# Patient Record
Sex: FEMALE | Race: BLACK OR AFRICAN AMERICAN | Employment: UNEMPLOYED | URBAN - METROPOLITAN AREA
[De-identification: names, ages, dates, MRNs, and addresses within clinical notes are randomized per-mention and may not be internally consistent; named-entity substitution may affect disease eponyms.]

---
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
## 2021-06-15 ENCOUNTER — OFFICE VISIT (OUTPATIENT)
Dept: OBGYN | Age: 21
End: 2021-06-15
Payer: COMMERCIAL

## 2021-06-15 ENCOUNTER — HOSPITAL ENCOUNTER (OUTPATIENT)
Age: 21
Setting detail: SPECIMEN
Discharge: HOME | End: 2021-06-15
Payer: COMMERCIAL

## 2021-06-15 VITALS
HEART RATE: 85 BPM | SYSTOLIC BLOOD PRESSURE: 124 MMHG | HEIGHT: 64 IN | DIASTOLIC BLOOD PRESSURE: 76 MMHG | WEIGHT: 155.8 LBS | BODY MASS INDEX: 26.6 KG/M2

## 2021-06-15 DIAGNOSIS — Z01.419 WELL WOMAN EXAM WITH ROUTINE GYNECOLOGICAL EXAM: Primary | ICD-10-CM

## 2021-06-15 DIAGNOSIS — Z01.419 WELL WOMAN EXAM WITH ROUTINE GYNECOLOGICAL EXAM: ICD-10-CM

## 2021-06-15 LAB
DIRECT EXAM: NORMAL
Lab: NORMAL
SPECIMEN DESCRIPTION: NORMAL

## 2021-06-15 PROCEDURE — 99211 OFF/OP EST MAY X REQ PHY/QHP: CPT | Performed by: STUDENT IN AN ORGANIZED HEALTH CARE EDUCATION/TRAINING PROGRAM

## 2021-06-15 PROCEDURE — 99385 PREV VISIT NEW AGE 18-39: CPT | Performed by: STUDENT IN AN ORGANIZED HEALTH CARE EDUCATION/TRAINING PROGRAM

## 2021-06-15 NOTE — PROGRESS NOTES
[facility] OB/GYN Annual Visit    Oliver Valladares  6/15/2021                       Primary Care Physician: Vita Pierre MD    CC:   Chief Complaint   Patient presents with    Established New Doctor         HPI: Oliver Valladares is a 24 y.o. female G0    The patient was seen and examined. She is here to establish care. She denies any complaints. Her Patient's last menstrual period was 06/08/2021 (approximate). . She denies irregular/heavy bleeding and dysmenorrhea. Her periods are regular and last 3-6 days. She describes them as heavy. She uses 3-4 pads per day and denies s/s of anemia. Her bowel habits are regular. She denies any bloating. She denies dysuria. She denies urinary leaking. She denies vaginal discharge. She is not sexually active and is not desiring pregnancy.      Depression Screen: Symptoms of decreased mood absent  Symptoms of anhedonia absent  **If either question is answered in a  positive fashion then complete the PHQ9 Scoring Evaluation and make the appropriate referral**    REVIEW OF SYSTEMS:   An 11 point review of systems was completed    Constitutional: negative fever, negative chills  HEENT: negative visual disturbances, negative headaches  Respiratory: negative dyspnea, negative cough  Cardiovascular: negative chest pain,  negative palpitations  Gastrointestinal: negative abdominal pain, negative RUQ pain, negative N/V, negative diarrhea, negative constipation  Genitourinary: negative dysuria, negative vaginal discharge  Dermatological: negative rash, negative wounds  Hematologic: negative bleeding/clotting disorder  Immunologic: negative recent illness, negative recent sick contact, negative allergic reactions  Lymphatic: negative lymph nodes  Musculoskeletal: negative back pain, negative myalgias, negative arthralgias  Neurological:  negative dizziness, negative weakness  Behavior/Psych: negative depression, negative anxiety ________________________________________________________________________    GYNECOLOGICAL HISTORY:  Age of Menarche: 16  Age of Menopause: N/A      Sexually Active: not sexually active  STD History: no past history    Pap History: Patient has never had a Pap test.  Colposcopy History: denies    Permanent Sterilization: no  Reversible Birth Control: no  Hormone Replacement Exposure: no    HEALTH MAINTENANCE:  Immunization status: up to date and documented  Garidisil immunization: done     OBSTETRICAL HISTORY:  OB History   No obstetric history on file. PAST MEDICAL HISTORY:   has a past medical history of Allergic and Allergy. PAST SURGICAL HISTORY:   has no past surgical history on file. ALLERGIES:  has No Known Allergies. MEDICATIONS:  Prior to Admission medications    Medication Sig Start Date End Date Taking? Authorizing Provider   mineral oil-hydrophilic petrolatum (HYDROPHOR) ointment Apply topically 4 times daily as needed. 10/11/18  Yes MAXIMUS Virk CNP   loratadine (CLARITIN) 10 MG tablet TAKE 1 TABLET DAILY 4/13/17  Yes MAXIMUS Virk CNP   hydrocortisone 2.5 % ointment Apply topically 2 times daily to affected areas of skin. Patient not taking: Reported on 6/15/2021 10/11/18   MAXIMUS Virk CNP   fluticasone (FLONASE) 50 MCG/ACT nasal spray 1 spray by Nasal route daily. Instill 1 spray into each nostril once daily for allergies and stuffy nose. Patient not taking: Reported on 10/15/2019 4/15/15   MAXIMUS Virk CNP       FAMILY HISTORY:  Family History of Breast, Ovarian, Colon or Uterine Cancer: No   family history includes Asthma in her mother; Bleeding Prob in her mother and sister; Hearing Loss in her maternal grandmother; High Blood Pressure in her maternal grandmother. SOCIAL HISTORY:   reports that she is a non-smoker but has been exposed to tobacco smoke.  She has never used smokeless tobacco. She reports that she does not drink alcohol and does not use drugs. VITALS:  Vitals:    06/15/21 1551   BP: 124/76   Site: Left Upper Arm   Position: Sitting   Cuff Size: Medium Adult   Pulse: 85   Weight: 155 lb 12.8 oz (70.7 kg)   Height: 5' 4\" (1.626 m)                                                                                                                                                                           PHYSICAL EXAM:      General Appearance: Appears healthy. Alert; in no acute distress. Pleasant. Skin: Normal  Lymphatic: No cervical, superclavicular, axillary, or inguinal adenopathy. HEENT: normocephalic and atraumatic, Thyroid normal to inspection and palpation  Respiratory: clear to auscultation, no wheezes, rales or rhonchi, symmetric air entry  Cardiovascular: normal, regular rate and rhythm, no murmurs, rubs, or gallops  Breast:  (Chest): declined   Abdomen: soft, non-tender, non-distended, no right upper quadrant tenderness and no CVA tenderness, no abdominal scars  Rectal Exam: deferred  Extremities: non-tender BLE and non-edematous  Musculoskeletal: no gross abnormalities  Psych: Alert and oriented, appropriate affect. Pelvic Exam:   Chaperone for Intimate Exam: Chaperone was present for entire exam, Chaperone Name: Frances Garcia MA  External genitalia: General appearance; normal, Hair distribution; normal, Lesions absent  Urinary system: urethral meatus normal and bladder not palpable  Vaginal: normal mucosa, no discharge  Cervix: not visualized due to patient intolerance of exam   Adnexa: normal adnexa in size, nontender and no masses  Uterus: normal single, nontender and mid-position  Rectal Exam: not indicated   Musculoskeletal: no gross abnormalities  Extremities: non-tender BLE and non-edematous  Psych:  oriented to time, place and person, mood and affect are within normal limits, pt is a good historian; no memory problems were noted     DATA:  No results found for this visit on 06/15/21. ASSESSMENT & PLAN:    Willis Schneider is a 24 y.o. female G0   - She previously received the Garidisil immunization   - Afebrile, VSS    - Pap not indicated due to age    - GC/C and vaginitis probe collected   - Barrier protection for STD prevention reviewed    - RTO in 1 year for annual unless indicated earlier     Patient Active Problem List    Diagnosis Date Noted    Intrinsic eczema 10/11/2018    Irritant contact dermatitis 10/11/2018    Sports physical 10/15/2015    Seasonal allergies 09/14/2012       Return in about 1 year (around 6/15/2022) for Annual Exam.  MRN in [employer]      Counseling Completed:    discussed need for repeat pap as per American Society for Colposcopy and Cervical Pathology guidelines. discussed birth control and barrier recommendations. discussed STD counseling and prevention. discussed Gardisil counseling for all patients 10-36 y.o. Tobacco & Secondary smoke risks discussed; with recommendation for cessation and avoidance. Routine health maintenance per patients PCP discussed. Patient was seen with total face to face time of 15 minutes. More than 50% of this visit was on counseling and education regarding the problems listed below and her options. She was also counseled on her preventative health maintenance recommendations and follow-up. Diagnosis Orders   1.  Well woman exam with routine gynecological exam  Chlamydia Trachomatis & Neisseria gonorrhoeae (GC) by amplified detection    Vaginitis DNA Probe        Hitesh Ferraro DO  Ob/Gyn Resident  [facility], [facility]  6/15/2021, 7:02 PM

## 2021-06-16 NOTE — PROGRESS NOTES
Attending Physician Statement  I have discussed the care of [facility], including pertinent history and exam findings,  with the resident. I have reviewed the key elements of all parts of the encounter with the resident. I agree with the assessment, plan and orders as documented by the resident.   (GE Modifier)      Cristine Gonzalez, DO

## 2021-06-17 LAB
C TRACH DNA GENITAL QL NAA+PROBE: NEGATIVE
N. GONORRHOEAE DNA: NEGATIVE
SPECIMEN DESCRIPTION: NORMAL